# Patient Record
Sex: MALE | URBAN - METROPOLITAN AREA
[De-identification: names, ages, dates, MRNs, and addresses within clinical notes are randomized per-mention and may not be internally consistent; named-entity substitution may affect disease eponyms.]

---

## 2021-02-01 ENCOUNTER — EMERGENCY (EMERGENCY)
Facility: HOSPITAL | Age: 36
LOS: 1 days | Discharge: HOME | End: 2021-02-01
Attending: EMERGENCY MEDICINE | Admitting: EMERGENCY MEDICINE
Payer: COMMERCIAL

## 2021-02-01 VITALS
OXYGEN SATURATION: 98 % | RESPIRATION RATE: 18 BRPM | SYSTOLIC BLOOD PRESSURE: 122 MMHG | DIASTOLIC BLOOD PRESSURE: 80 MMHG | HEART RATE: 93 BPM

## 2021-02-01 VITALS
DIASTOLIC BLOOD PRESSURE: 73 MMHG | WEIGHT: 240.08 LBS | OXYGEN SATURATION: 96 % | HEIGHT: 72 IN | TEMPERATURE: 99 F | RESPIRATION RATE: 16 BRPM | HEART RATE: 96 BPM | SYSTOLIC BLOOD PRESSURE: 103 MMHG

## 2021-02-01 PROCEDURE — 99284 EMERGENCY DEPT VISIT MOD MDM: CPT

## 2021-02-01 PROCEDURE — 99053 MED SERV 10PM-8AM 24 HR FAC: CPT

## 2021-02-01 PROCEDURE — 73562 X-RAY EXAM OF KNEE 3: CPT

## 2021-02-01 PROCEDURE — 99283 EMERGENCY DEPT VISIT LOW MDM: CPT

## 2021-02-01 PROCEDURE — 73562 X-RAY EXAM OF KNEE 3: CPT | Mod: 26,LT

## 2021-02-01 RX ORDER — IBUPROFEN 200 MG
600 TABLET ORAL ONCE
Refills: 0 | Status: COMPLETED | OUTPATIENT
Start: 2021-02-01 | End: 2021-02-01

## 2021-02-01 RX ADMIN — Medication 600 MILLIGRAM(S): at 04:06

## 2021-02-01 NOTE — ED PROVIDER NOTE - NSFOLLOWUPCLINICS_GEN_ALL_ED_FT
[facility]  Orthopedic Surgery  [address] [address]  Phone: [phone]  Fax:   Follow Up Time:

## 2021-02-01 NOTE — ED PROVIDER NOTE - NSFOLLOWUPINSTRUCTIONS_ED_ALL_ED_FT
Take Motrin 600 mg every 8 hours as need for pain or swelling  Keep knee wrapped in ace bandage to minimize swelling. You can use knee immobilizer if having increased pain while bearing weight or walker.   Follow up with orthopedic doctor if pain continues after swelling subsides. Take Motrin 600 mg every 8 hours as need for pain or swelling  Keep knee wrapped in ace bandage to minimize swelling. You can use knee immobilizer if having increased pain while bearing weight or walking.   Follow up with orthopedic doctor if pain continues after swelling subsides. You were treated for swelling of your knee after your fall (contusion).   Take Motrin 600 mg every 8 hours as need for pain or swelling  Keep knee wrapped in ace bandage to minimize swelling. You can use knee immobilizer if having increased pain while bearing weight or walking.   Follow up with orthopedic doctor if pain continues after swelling subsides.

## 2021-02-01 NOTE — ED PROVIDER NOTE - CLINICAL SUMMARY MEDICAL DECISION MAKING FREE TEXT BOX
36 y/o M presenting with pain to left leg/knee after slipping on snow  Notable effusion on exam-will r/o occult fracture with plain films and likely will discharge with NSAIDs, immbolizer/ace and orthopedic f/u. 36 y/o M presenting with pain to left leg/knee after slipping on snow  Notable swelling on exam-will r/o occult fracture with plain films and likely will discharge with NSAIDs, immbolizer/ace and orthopedic f/u. 36 y/o M presenting with pain to left leg/knee after slipping on snow  Notable swelling on exam-will r/o occult fracture with plain films and likely will discharge with NSAIDs, immbolizer/ace and orthopedic f/u.  Attending Emma Leong: 36 y/o male presenting with left knee pain. no ttp tibital plateau and no deformity making fracture less likely. ext wwp, and distal pulses intact. no focal neurologic deficit. able to elevated knee less likely tendon rupture. concern for possible ligametnous injury. will obtain xray, pain control ice.

## 2021-02-01 NOTE — ED PROVIDER NOTE - OBJECTIVE STATEMENT
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 Arrives via EMS and has not attempted to bear weight on leg. No other injuries or head trauma.

## 2021-02-01 NOTE — ED PROVIDER NOTE - ATTENDING CONTRIBUTION TO CARE
Attending MD Emma Leong:  I personally have seen and examined this patient.  Resident note reviewed and agree on plan of care and except where noted.  See HPI, PE, and MDM for details.

## 2021-02-01 NOTE — ED ADULT NURSE NOTE - OBJECTIVE STATEMENT
36 y/o male coming in via EMS from work as  s/p fall. AOx4, ambulatory at baseline, denies any PMH. Pt. states he was on duty as  when he slipped and fell on the snow. Denies head injury or LOC. Reports ambulated after the fall but was in pain and it was difficult. Reporting left knee pain, left calf throbbing, pain behind left knee. Pain worse with palpation to shin area. Left leg appears equal in size to right leg. DP and posterior tibialis pulses present and strong. Denies numbness/tingling. Denies any associated chest pain, SOB, fever, chills, abdominal pain, N/V/D. Denies any other complaints. Pt. is well appearing. VSS. Will continue to reassess.

## 2021-02-01 NOTE — ED PROVIDER NOTE - PATIENT PORTAL LINK FT
You can access the FollowMyHealth Patient Portal offered by  by registering at the following website: [email or web address]. By joining [employer]’s FollowMyHealth portal, you will also be able to view your health information using other applications (apps) compatible with our system.

## 2021-02-01 NOTE — ED ADULT NURSE NOTE - NSIMPLEMENTINTERV_GEN_ALL_ED
Implemented All Fall Risk Interventions:  [place] to call system. Call bell, personal items and telephone within reach. Instruct patient to call for assistance. Room bathroom lighting operational. Non-slip footwear when patient is off stretcher. Physically safe environment: no spills, clutter or unnecessary equipment. Stretcher in lowest position, wheels locked, appropriate side rails in place. Provide visual cue, wrist band, yellow gown, etc. Monitor gait and stability. Monitor for mental status changes and reorient to person, place, and time. Review medications for side effects contributing to fall risk. Reinforce activity limits and safety measures with patient and family.

## 2021-02-01 NOTE — ED PROVIDER NOTE - PROGRESS NOTE DETAILS
Attending Emma Leong: pt's pain improved. pt given knee immobolizer, will give ortho follow up. close return precuations for any changes

## 2021-02-01 NOTE — ED PROVIDER NOTE - PHYSICAL EXAMINATION
GENERAL: no acute distress; well-developed  HEAD:  Atraumatic, Normocephalic  ENT: MMM; oropharynx clear  NECK: Supple, No JVD  CHEST/LUNG: Clear to auscultation bilaterally; No wheeze  HEART: Regular rate and rhythm; No murmurs, rubs, or gallops  ABDOMEN: Soft, Nontender, Nondistended; Bowel sounds present  EXTREMITIES:  2+ Peripheral Pulses, +swelling around left knee especially superior to patella. Limited range in flexion 2/2 to pain.   SKIN: No rashes or lesions GENERAL: no acute distress; well-developed  HEAD:  Atraumatic, Normocephalic  ENT: MMM; oropharynx clear  NECK: Supple, No JVD  CHEST/LUNG: Clear to auscultation bilaterally; No wheeze  HEART: Regular rate and rhythm; No murmurs, rubs, or gallops  ABDOMEN: Soft, Nontender, Nondistended; Bowel sounds present  EXTREMITIES:  2+ Peripheral Pulses, +swelling around left knee especially superior to patella. Limited range in flexion 2/2 to pain.   SKIN: No rashes or lesions  Attending Emma Leong: Gen: NAD, heent: atrauamtic, eomi,, mmm,, neck; nttp, , chest: nttp,  cv: rrr, no murmurs, lungs: ctab, abd: soft, nontender, nondistended, no peritoneal signs,, ext: wwpttp left medial and posterior knee, small effusion medially. able to elevated knee, negative anterior drawer, no deformity, skin: no rash, neuro: awake and alert, following commands, speech clear, sensation and strength intact, no focal deficits

## 2021-03-03 ENCOUNTER — OUTPATIENT (OUTPATIENT)
Dept: OUTPATIENT SERVICES | Facility: HOSPITAL | Age: 36
LOS: 1 days | End: 2021-03-03
Payer: COMMERCIAL

## 2021-03-03 VITALS
WEIGHT: 255.96 LBS | DIASTOLIC BLOOD PRESSURE: 88 MMHG | RESPIRATION RATE: 16 BRPM | HEART RATE: 88 BPM | SYSTOLIC BLOOD PRESSURE: 136 MMHG | OXYGEN SATURATION: 99 % | TEMPERATURE: 97 F

## 2021-03-03 DIAGNOSIS — S83.242D OTHER TEAR OF MEDIAL MENISCUS, CURRENT INJURY, LEFT KNEE, SUBSEQUENT ENCOUNTER: ICD-10-CM

## 2021-03-03 DIAGNOSIS — Z01.818 ENCOUNTER FOR OTHER PREPROCEDURAL EXAMINATION: ICD-10-CM

## 2021-03-03 LAB
HCT VFR BLD CALC: 43.4 % — SIGNIFICANT CHANGE UP (ref 39–50)
HGB BLD-MCNC: 15.5 G/DL — SIGNIFICANT CHANGE UP (ref 13–17)
MCHC RBC-ENTMCNC: 30.6 PG — SIGNIFICANT CHANGE UP (ref 27–34)
MCHC RBC-ENTMCNC: 35.7 GM/DL — SIGNIFICANT CHANGE UP (ref 32–36)
MCV RBC AUTO: 85.6 FL — SIGNIFICANT CHANGE UP (ref 80–100)
NRBC # BLD: 0 /100 WBCS — SIGNIFICANT CHANGE UP (ref 0–0)
PLATELET # BLD AUTO: 189 K/UL — SIGNIFICANT CHANGE UP (ref 150–400)
RBC # BLD: 5.07 M/UL — SIGNIFICANT CHANGE UP (ref 4.2–5.8)
RBC # FLD: 13.1 % — SIGNIFICANT CHANGE UP (ref 10.3–14.5)
WBC # BLD: 6.13 K/UL — SIGNIFICANT CHANGE UP (ref 3.8–10.5)
WBC # FLD AUTO: 6.13 K/UL — SIGNIFICANT CHANGE UP (ref 3.8–10.5)

## 2021-03-03 PROCEDURE — 87635 SARS-COV-2 COVID-19 AMP PRB: CPT

## 2021-03-03 PROCEDURE — 36415 COLL VENOUS BLD VENIPUNCTURE: CPT

## 2021-03-03 PROCEDURE — G0463: CPT

## 2021-03-03 PROCEDURE — U0005: CPT

## 2021-03-03 PROCEDURE — 85027 COMPLETE CBC AUTOMATED: CPT

## 2021-03-03 NOTE — H&P PST ADULT - NSICDXPROBLEM_GEN_ALL_CORE_FT
PROBLEM DIAGNOSES  Problem: Preoperative testing  Assessment and Plan: No medical clearance needed as per surgeon. CBC and COVID19 PCR ordered. Pre-op instructions and surgical scrubs given and pt verbalized understanding.     Problem: Other tear of medial meniscus, current injury, left knee, subsequent encounter  Assessment and Plan: Left knee arthroscopy, possible menisectomy on 3/5/2021.

## 2021-03-03 NOTE — H&P PST ADULT - ATTENDING COMMENTS
I WENT OVER ALL POSSIBLE RISKS OF LEFT KNEE ARTHROSCOPIC SURGERY.  DISCUSSED MENISECTOMY VS MENISCAL REPAIR. PT WISHES TO PROCEED WITH SURGERY.  NO GUARANTEES MADE ALL QUESTIONS ANSWERED

## 2021-03-03 NOTE — H&P PST ADULT - NSICDXPASTMEDICALHX_GEN_ALL_CORE_FT
PAST MEDICAL HISTORY:  Other tear of medial meniscus, current injury, left knee, subsequent encounter

## 2021-03-03 NOTE — H&P PST ADULT - GENERAL COMMENTS
Pt denies history of travel outside the country and outside [facility], denies having had the COVID19 infection and denies COVID19 positive contacts within the last 14 days.

## 2021-03-03 NOTE — H&P PST ADULT - HISTORY OF PRESENT ILLNESS
36 y/o male with no PMH here for PST. Pt s/p fall on ice while at work on 2/1/2021. Pt states he has intermittent left knee pain worse with bending knees. Pt rates pain to be 3/10 but denies taking po analgesia. Pt states he has left knee swelling and with limping gait. Pt s/p MRI - "there are 2 tears in my left knee". Pt electing for left knee arthroscopy, possible menisectomy on 3/5/2021.  34 y/o male with no PMH here for PST. Pt s/p fall on ice while at work on 2/1/2021. Pt states he has intermittent left knee pain worse with bending knees. Pt rates pain to be 3/10 but denies taking po analgesia. Pt states he has left knee swelling and limping gait. Pt s/p MRI - "there are 2 tears in my left knee". Pt electing for left knee arthroscopy, possible menisectomy on 3/5/2021.

## 2021-03-04 LAB — SARS-COV-2 RNA SPEC QL NAA+PROBE: SIGNIFICANT CHANGE UP

## 2021-03-04 RX ORDER — SODIUM CHLORIDE 9 MG/ML
1000 INJECTION, SOLUTION INTRAVENOUS
Refills: 0 | Status: DISCONTINUED | OUTPATIENT
Start: 2021-03-05 | End: 2021-03-05

## 2021-03-05 ENCOUNTER — OUTPATIENT (OUTPATIENT)
Dept: OUTPATIENT SERVICES | Facility: HOSPITAL | Age: 36
LOS: 1 days | End: 2021-03-05
Payer: SELF-PAY

## 2021-03-05 VITALS
HEART RATE: 68 BPM | OXYGEN SATURATION: 97 % | DIASTOLIC BLOOD PRESSURE: 72 MMHG | RESPIRATION RATE: 15 BRPM | SYSTOLIC BLOOD PRESSURE: 118 MMHG

## 2021-03-05 VITALS
RESPIRATION RATE: 14 BRPM | DIASTOLIC BLOOD PRESSURE: 79 MMHG | TEMPERATURE: 98 F | WEIGHT: 255.96 LBS | OXYGEN SATURATION: 97 % | HEART RATE: 61 BPM | SYSTOLIC BLOOD PRESSURE: 117 MMHG

## 2021-03-05 DIAGNOSIS — S83.242D OTHER TEAR OF MEDIAL MENISCUS, CURRENT INJURY, LEFT KNEE, SUBSEQUENT ENCOUNTER: ICD-10-CM

## 2021-03-05 PROCEDURE — 29880 ARTHRS KNE SRG MNISECTMY M&L: CPT | Mod: LT

## 2021-03-05 PROCEDURE — 88304 TISSUE EXAM BY PATHOLOGIST: CPT | Mod: 26

## 2021-03-05 PROCEDURE — 88304 TISSUE EXAM BY PATHOLOGIST: CPT

## 2021-03-05 RX ORDER — ONDANSETRON 8 MG/1
1 TABLET, FILM COATED ORAL
Qty: 20 | Refills: 0
Start: 2021-03-05 | End: 2021-03-09

## 2021-03-05 RX ORDER — ASPIRIN/CALCIUM CARB/MAGNESIUM 324 MG
1 TABLET ORAL
Qty: 30 | Refills: 0
Start: 2021-03-05 | End: 2021-04-03

## 2021-03-05 RX ORDER — DOCUSATE SODIUM 100 MG
1 CAPSULE ORAL
Qty: 60 | Refills: 0
Start: 2021-03-05 | End: 2021-04-03

## 2021-03-05 RX ORDER — HYDROMORPHONE HYDROCHLORIDE 2 MG/ML
0.5 INJECTION INTRAMUSCULAR; INTRAVENOUS; SUBCUTANEOUS
Refills: 0 | Status: DISCONTINUED | OUTPATIENT
Start: 2021-03-05 | End: 2021-03-05

## 2021-03-05 RX ORDER — SODIUM CHLORIDE 9 MG/ML
1000 INJECTION, SOLUTION INTRAVENOUS
Refills: 0 | Status: DISCONTINUED | OUTPATIENT
Start: 2021-03-05 | End: 2021-03-05

## 2021-03-05 RX ORDER — OXYCODONE HYDROCHLORIDE 5 MG/1
5 TABLET ORAL ONCE
Refills: 0 | Status: DISCONTINUED | OUTPATIENT
Start: 2021-03-05 | End: 2021-03-05

## 2021-03-05 RX ORDER — ONDANSETRON 8 MG/1
4 TABLET, FILM COATED ORAL ONCE
Refills: 0 | Status: DISCONTINUED | OUTPATIENT
Start: 2021-03-05 | End: 2021-03-05

## 2021-03-05 RX ADMIN — HYDROMORPHONE HYDROCHLORIDE 0.5 MILLIGRAM(S): 2 INJECTION INTRAMUSCULAR; INTRAVENOUS; SUBCUTANEOUS at 12:00

## 2021-03-05 RX ADMIN — SODIUM CHLORIDE 75 MILLILITER(S): 9 INJECTION, SOLUTION INTRAVENOUS at 12:00

## 2021-03-05 NOTE — ASU DISCHARGE PLAN (ADULT/PEDIATRIC) - ASU DC SPECIAL INSTRUCTIONSFT
Pain Management  You will be given a prescription for pain medications upon discharge from the hospital. You should expect to have pain for the first week or so after surgery. Pain medication should be taken to help alleviate the pain so that you are comfortable and can participate in physical therapy, which should start within a week of your surgery. Take the medication as directed. You may decrease the amount of pain medication, as tolerated, when pain improves. You should keep the leg elevated at night, but do NOT sleep with a pillow under the knee so that the knee is bent. The knee should remain straight at night.    Treatment of Nausea  Anesthesia and pain medication can make you nauseous after surgery. You will be given a prescription for anti-nausea medication, to be used as needed. If nauseous or vomiting after 24 hours, call the office.    Anticoagulation  You should take one adult aspirin per day for a total of two weeks following surgery. This will help with circulation and help to prevent blood clots. If you have an increase in calf pain or swelling, call the office.    Mobility/Activity  You may walk on the leg as tolerated, but avoid doing too much activity for the first few days after surgery. You may use crutches to minimize discomfort, as needed. You may bend your knee right away, as tolerated by pain.    Dressing/Wound  You must keep your dressing dry and clean. It can be removed on the third day following surgery, but the Steri-strips should remain in place until your follow-up appointment. You can shower on the third post-operative day, but you must wrap the knee in Saran wrap and tape to keep the wound dry for the first two weeks. Do not soak/submerge the leg (no bathtub or swimming pool). If there is any redness, excessive swelling, or drainage from the incision, please call the office.

## 2021-03-05 NOTE — ASU DISCHARGE PLAN (ADULT/PEDIATRIC) - CALL YOUR DOCTOR IF YOU HAVE ANY OF THE FOLLOWING:
Bleeding that does not stop/Fever greater than (need to indicate Fahrenheit or Celsius)/Wound/Surgical Site with redness, or foul smelling discharge or pus/Numbness, tingling, color or temperature change to extremity

## 2021-03-05 NOTE — ASU DISCHARGE PLAN (ADULT/PEDIATRIC) - CARE PROVIDER_API CALL
Sea Gaitan (DO)  Orthopaedic Surgery  [address] [place] [address]  Phone: [phone]  Fax: [phone]  Follow Up Time: 1 week

## 2021-08-09 PROBLEM — Z78.9 OTHER SPECIFIED HEALTH STATUS: Chronic | Status: ACTIVE | Noted: 2021-02-01

## 2023-05-01 NOTE — H&P PST ADULT - BP NONINVASIVE SYSTOLIC (MM HG)
Quality 130: Documentation Of Current Medications In The Medical Record: Current Medications Documented Detail Level: Detailed Quality 431: Preventive Care And Screening: Unhealthy Alcohol Use - Screening: Patient not identified as an unhealthy alcohol user when screened for unhealthy alcohol use using a systematic screening method Quality 226: Preventive Care And Screening: Tobacco Use: Screening And Cessation Intervention: Patient screened for tobacco use and is an ex/non-smoker 136